# Patient Record
Sex: FEMALE | Race: WHITE
[De-identification: names, ages, dates, MRNs, and addresses within clinical notes are randomized per-mention and may not be internally consistent; named-entity substitution may affect disease eponyms.]

---

## 2017-05-12 ENCOUNTER — HOSPITAL ENCOUNTER (OUTPATIENT)
Dept: HOSPITAL 47 - RADMAMWWP | Age: 67
Discharge: HOME | End: 2017-05-12
Attending: SURGERY
Payer: MEDICARE

## 2017-05-12 DIAGNOSIS — R92.2: ICD-10-CM

## 2017-05-12 DIAGNOSIS — Z12.31: Primary | ICD-10-CM

## 2017-05-12 PROCEDURE — 77063 BREAST TOMOSYNTHESIS BI: CPT

## 2017-05-16 NOTE — MM
Reason for exam: screening  (asymptomatic).

Last mammogram was performed 1 year and 1 month ago.



History:

Patient is postmenopausal.

Family history of breast cancer in sister at age 50.

Benign US biopsy breast VAD LT of the left breast, December 26, 2014.  Benign 
US 

breast needle core LT of the left breast, June 18, 2014.  Benign left mammotome 

panel of the left breast, November 27, 2013.

Took estrogen for 10 years.



Physical Findings:

A clinical breast exam by your physician is recommended on an annual basis and 

results should be correlated with mammographic findings.



MG 3D Screening Mammo W/Cad

Bilateral CC and MLO view(s) were taken.

Prior study comparison: April 25, 2016, bilateral MG 3d screening mammo w/cad.  

June 29, 2015, left breast US breast LT.  December 17, 2014, bilateral MG 

diagnostic mammo w CAD [facility]O.  August 28, 2012, mammogram, performed at [facility].

There are scattered fibroglandular densities.  Previous mammotome biopsy within 

the right breast x two with associated focal asymmetry. Stable grouped 

calcifications medial posterior right breast. Focal asymmetry 1 o'clock in the 

left breast at a middle depth appears more defined. It incompletely disperses 
on 

tomosynthesis images.





ASSESSMENT: Incomplete: need additional imaging evaluation, BI-RAD 0



RECOMMENDATION:

Special view mammogram of the left breast.



If lesion persists on supplemental views, image directed ultrasound is 

recommended.



[facility] will attempt to contact patient to return for 
supplemental 

views and ultrasound if indicated.

[facility]

## 2017-05-24 ENCOUNTER — HOSPITAL ENCOUNTER (OUTPATIENT)
Dept: HOSPITAL 47 - RADMAMWWP | Age: 67
Discharge: HOME | End: 2017-05-24
Attending: SURGERY
Payer: MEDICARE

## 2017-05-24 DIAGNOSIS — R92.8: Primary | ICD-10-CM

## 2017-05-24 PROCEDURE — 76642 ULTRASOUND BREAST LIMITED: CPT

## 2017-05-24 NOTE — MM
Reason for exam: additional evaluation requested from abnormal screening.

Last mammogram was performed less than 1 month ago.



History:

Patient is postmenopausal.

Family history of breast cancer in sister at age 50.

Benign US biopsy breast VAD LT of the left breast, December 26, 2014.  Benign US 

breast needle core LT of the left breast, June 18, 2014.  Benign left mammotome 

panel of the left breast, November 27, 2013.

Took estrogen for 10 years.



Physical Findings:

Nurse Summary: 1cm nodule in the left breast at 10 o'clock (nurse mm).



MG 3D Work Up W/Cad LT

ML, spot compression CC, and spot compression MLO view(s) were taken of the left 

breast.

Prior study comparison: May 12, 2017, bilateral MG 3d screening mammo w/cad.  

April 25, 2016, bilateral MG 3d screening mammo w/cad.

The breast tissue is heterogeneously dense. This may lower the sensitivity of 

mammography.

Finding #1: There is a 5 mm mass in the outer quadrant of the left breast.

Finding #2: There are typically benign calcifications in the left breast.



These results were verbally communicated with the patient and result sheet given 

to the patient on 05/24/17.





ASSESSMENT: Incomplete: need additional imaging evaluation, BI-RAD 0



RECOMMENDATION:

Ultrasound of the left breast.

## 2017-05-24 NOTE — USB
Reason for exam: additional evaluation requested from abnormal screening.



History:

Patient is postmenopausal.

Family history of breast cancer in sister at age 50.

Benign US biopsy breast VAD LT of the left breast, December 26, 2014.  Benign US 

breast needle core LT of the left breast, June 18, 2014.  Benign left mammotome 

panel of the left breast, November 27, 2013.

Took estrogen for 10 years.



Physical Findings:

Nurse did not find any significant physical abnormalities on exam.



US Breast Workup Limited LT

Left breast ultrasound demonstrates a 0.5 x 0.4 x 0.4cm hypoechoic lesion at 4 

o'clock and a 0.4 x 0.3 x 0.4cm hypoechoic lesion at 4 o'clock.



Multiple nodes noted in axilla.



These results were verbally communicated with the patient and result sheet given 

to the patient on 05/24/17.





ASSESSMENT: Suspicious, BI-RAD 4



RECOMMENDATION:

Ultrasound core biopsy of the left breast.



Called with mammographic findings and has scheduled an appointment for the patient

for 05/30/17 at 9:00 with Dr. Pham.



PRELIMINARY REPORT CALLED AND FAXED TO DR. PHAM ON  05/24/17 /TMP.

## 2017-06-05 ENCOUNTER — HOSPITAL ENCOUNTER (OUTPATIENT)
Dept: HOSPITAL 47 - RADUSWWP | Age: 67
End: 2017-06-05
Attending: SURGERY
Payer: MEDICARE

## 2017-06-05 DIAGNOSIS — N60.32: Primary | ICD-10-CM

## 2017-06-05 DIAGNOSIS — R92.8: ICD-10-CM

## 2017-06-05 DIAGNOSIS — N64.1: ICD-10-CM

## 2017-06-05 PROCEDURE — 76942 ECHO GUIDE FOR BIOPSY: CPT

## 2017-06-05 PROCEDURE — 19083 BX BREAST 1ST LESION US IMAG: CPT

## 2017-06-05 PROCEDURE — 19000 PUNCTURE ASPIR CYST BREAST: CPT

## 2017-06-05 PROCEDURE — 88305 TISSUE EXAM BY PATHOLOGIST: CPT

## 2017-06-05 NOTE — USB
EXAMINATION TYPE: US breast aspiration single LT, MG diagnostic mammo LT wo CAD
, US biopsy breast VAD LT

 

DATE OF EXAM: 6/5/2017

 

CLINICAL HISTORY: R92.8 abn mamm.

 

TECHNIQUE: Ultrasound guided fine-needle aspiration and core biopsy of left 
breast with clip placement and follow-up two-view mammogram.  

 

COMPARISON: Diagnostic left breast mammogram and ultrasound workup May 24, 2017 
and older studies.

 

FINDINGS: The procedure of ultrasound guided fine-needle aspiration and/or core 
biopsy was explained to the patient.  Benefits, alternatives, and risks were 
discussed.  An informed consent was then obtained.  

 

The patient was placed in supine positioning for  imaging and for the 
procedure. Preprocedure imaging redemonstrates 2 well-defined 3 to 4 mm 
hypoechoic anechoic lesions 4:00 position zone B see in the left breast which 
are believed to correlate to area of concern on prior ultrasound. The overlying 
skin was prepped and draped in usual sterile fashion.  Lidocaine buffered with 
bicarbonate was used as anesthetic into the skin and subcutaneous tissue up to 
area of concern in the left breast. Under ultrasound guidance, one lesion is 
successfully aspirated with less than 1 cc of yellow-colored fluid utilizing 20-
gauge spinal needle. Fluid is discarded due to limited volume and benign gross 
appearance. Second lesion is unsuccessful in aspiration. Under ultrasound 
guidance, a 12-gauge vacuum assisted biopsy gun device was used to obtain 2 
core samples.  Following this, a biopsy clip was left in lesion.  

 

The patient tolerated the procedure well without any immediate complication.  
The patient was kept in the radiology department for short stay after the 
procedure and then discharged home in stable condition.  

 

Postprocedure mammogram shows successful deployment of new clip inferior outer 
quadrant correlating with 4:00 position on ultrasound.

 

IMPRESSION: Successful, uncomplicated ultrasound guided core biopsy of area of 
concern in the left breast, full pathology results to follow.

 

Low index of suspicion noted at time of procedure.



Pathology Results: Benign



BREAST, LEFT, CORE BIOPSY: BENIGN FIBROADIPOSE TISSUE WITH FAT NECROSIS, 
CHRONIC INFLAMMATION AND FIBROSIS. BREAST ELEMENTS ARE NOT IDENTIFIED.



Recommendation

Follow up ultrasound of the left breast in 6 months.

 

 

 

MTDD

## 2018-06-18 ENCOUNTER — HOSPITAL ENCOUNTER (OUTPATIENT)
Dept: HOSPITAL 47 - RADMAMWWP | Age: 68
Discharge: HOME | End: 2018-06-18
Attending: FAMILY MEDICINE
Payer: MEDICARE

## 2018-06-18 DIAGNOSIS — Z12.31: Primary | ICD-10-CM

## 2018-06-18 PROCEDURE — 77067 SCR MAMMO BI INCL CAD: CPT

## 2018-06-18 PROCEDURE — 77063 BREAST TOMOSYNTHESIS BI: CPT

## 2018-06-18 NOTE — MM
Reason for exam: screening  (asymptomatic).

Last mammogram was performed 1 year ago.



History:

Patient is postmenopausal and history of other cancer.

Family history of breast cancer in sister at age 50.

US breast aspiration single LT of the left breast, June 5, 2017.  Benign US biopsy

breast VAD LT of the left breast, June 5, 2017.  Benign US biopsy breast VAD LT 

of the left breast, December 26, 2014.  Benign US breast needle core LT of the 

left breast, June 18, 2014.  Benign left mammotome panel of the left breast, 

November 27, 2013.

Took estrogen for 10 years.



Physical Findings:

A clinical breast exam by your physician is recommended on an annual basis and 

results should be correlated with mammographic findings.



MG 3D Screening Mammo W/Cad

Bilateral CC and MLO view(s) were taken.

Prior study comparison: June 5, 2017, left breast MG diagnostic mammo LT wo CAD.  

May 24, 2017, left breast MG 3d work up w/cad LT.

The breast tissue is heterogeneously dense. This may lower the sensitivity of 

mammography.

Finding #1: Stable architectural distortion in the left breast.

Finding #2: There are typically benign dystrophic, round, grouped/clustered and 

scattered calcifications in both breasts. Previous mammotome biopsy in the left 

breast x 3. There is no discrete abnormality.





ASSESSMENT: Benign, BI-RAD 2



RECOMMENDATION:

Routine screening mammogram of both breasts in 1 year.

## 2018-11-02 ENCOUNTER — HOSPITAL ENCOUNTER (OUTPATIENT)
Dept: HOSPITAL 47 - RADCTMAIN | Age: 68
Discharge: HOME | End: 2018-11-02
Attending: UROLOGY
Payer: MEDICARE

## 2018-11-02 DIAGNOSIS — N21.0: ICD-10-CM

## 2018-11-02 DIAGNOSIS — K57.30: ICD-10-CM

## 2018-11-02 DIAGNOSIS — N20.0: Primary | ICD-10-CM

## 2018-11-02 PROCEDURE — 74178 CT ABD&PLV WO CNTR FLWD CNTR: CPT

## 2018-11-02 NOTE — CT
EXAMINATION TYPE: CT abdomen pelvis wo/w con

 

DATE OF EXAM: 11/2/2018

 

HISTORY: Multiple UTI's. Pelvic pain.

 

CT DLP: 1933.7mGycm  Automated Exposure Control for Dose Reduction was Utilized.

 

CONTRAST: 

CT scan of the abdomen and pelvis is performed without and with IV Contrast, patient injected with 10
0 mL of Isovue 300.

 

COMPARISON: CTA chest dated 9/27/2013

 

FINDINGS:

 

LUNG BASES: The left lung base there is a 1.4 cm pulmonary nodule. This is very similar in comparison
 to the exam of 9/27/2013 where this measured 1.2 cm. There is question to be internal macroscopic fa
t and this could relate to a trauma as there are smooth and lobular borders. Bibasilar subsegmental a
telectasis is noted.

 

LIVER/GB: Hepatic parenchyma is diffusely hypoattenuated in comparison to that of the spleen, most co
mmonly seen in hepatic steatosis. This finding limits evaluation for hepatic masses. No gross evidenc
e of hepatic mass is seen. No intrahepatic biliary ductal dilatation. Gallbladder surgically absent.

 

PANCREAS: No significant abnormality is seen.

 

SPLEEN: No significant abnormality is seen.

 

ADRENALS: No significant abnormality is seen.

 

KIDNEYS: On the noncontrast images there is there are bilateral renal calculi. On the left there are 
2 3 mm nonobstructing upper pole renal calculi and on the right there is one mid pole 3 mm nonobstruc
ting calculus.

 

On the nephrographic phase on image 37 in both kidneys there are 4 mm hypoattenuated lesions that are
 too small to accurately characterize.

 

No suspicious renal mass is seen of either kidney. There is no evidence of hydronephrosis of either k
idney. No uroepithelial thickening is seen. Automated no proximal ureteral stricture is identified. U
rinary bladder is not opacified and incompletely evaluated, however there is a calculus within the an
terior superior urinary bladder appearing to be within the wall or lying dependently against the wall
.

 

BOWEL: Multiple colonic diverticula some containing inspissated contrast and/or debris. No pericoloni
c fat stranding is noted. Contrast is seen retained within the distal esophagus that could relate to 
gastroesophageal reflux or decreased propulsion of contrast. The cecum demonstrates slight wall thick
ening on series 6 image 66. Moderate amount retained colonic stool is noted, slightly limiting evalua
tion of the bowel. No dilated large or small bowel is seen duodenal diverticulum is incidentally note
d near the head of the pancreas. This contains contrast.

 

UTERUS/ADNEXA: Uterus appears surgically absent.

 

LYMPH NODES: No greater than 1cm abdominal or pelvic lymph nodes are appreciated.

 

OSSEOUS STRUCTURES: Minimal multilevel degenerative changes of the spine are seen

 

OTHER: Abdominal aorta is of normal course and caliber with mild atherosclerosis.

 

IMPRESSION:

1. Bilateral renal calculi without evidence of obstructive uropathy.

2. Punctate solitary urinary bladder calculus intimately associated with the urinary bladder wall davoin
t may be sequela of repeated infections or may represent a recently passed renal calculus.

3. No evidence of suspicious renal mass or uroepithelial thickening. Too small to accurately characte
rize bilateral renal lesions are of low suspicion.

4. Colonic diverticulosis without evidence of acute diverticulitis.

5. 1.4 cm left lower lobe pulmonary nodule suspected to represent a benign hamartoma demonstrating on
ly 2 mm of interval growth since 2013, therefore highly likely benign.

## 2019-06-24 ENCOUNTER — HOSPITAL ENCOUNTER (OUTPATIENT)
Dept: HOSPITAL 47 - RADMAMWWP | Age: 69
Discharge: HOME | End: 2019-06-24
Attending: FAMILY MEDICINE
Payer: MEDICARE

## 2019-06-24 DIAGNOSIS — Z12.31: Primary | ICD-10-CM

## 2019-06-24 PROCEDURE — 77063 BREAST TOMOSYNTHESIS BI: CPT

## 2019-06-24 PROCEDURE — 77067 SCR MAMMO BI INCL CAD: CPT

## 2019-06-25 NOTE — MM
Reason for exam: screening  (asymptomatic).

Last mammogram was performed 1 year ago.



History:

Patient is postmenopausal and history of other cancer.

Family history of breast cancer in sister at age 50.

US breast aspiration single LT of the left breast, June 5, 2017.  Benign US biopsy

breast VAD LT of the left breast, June 5, 2017.  Benign US biopsy breast VAD LT 

of the left breast, December 26, 2014.  Benign US breast needle core LT of the 

left breast, June 18, 2014.  Benign left mammotome panel of the left breast, 

November 27, 2013.

Took estrogen for 10 years.



Physical Findings:

A clinical breast exam by your physician is recommended on an annual basis and 

results should be correlated with mammographic findings.



MG 3D Screening Mammo W/Cad

Bilateral CC and MLO view(s) were taken.

Prior study comparison: June 18, 2018, bilateral MG 3d screening mammo w/cad.  

June 5, 2017, left breast MG diagnostic mammo LT wo CAD.

The breast tissue is heterogeneously dense. This may lower the sensitivity of 

mammography.  Stable scattered calcifications.  No significant changes when 

compared with prior studies.





ASSESSMENT: Benign, BI-RAD 2



RECOMMENDATION:

Routine screening mammogram of both breasts in 1 year.

## 2020-04-17 ENCOUNTER — HOSPITAL ENCOUNTER (OUTPATIENT)
Dept: HOSPITAL 47 - RADPETMAIN | Age: 70
Discharge: HOME | End: 2020-04-17
Attending: INTERNAL MEDICINE
Payer: MEDICARE

## 2020-04-17 DIAGNOSIS — C43.39: Primary | ICD-10-CM

## 2020-04-17 PROCEDURE — 78816 PET IMAGE W/CT FULL BODY: CPT

## 2020-04-20 NOTE — PE
EXAMINATION TYPE: PET CT fusion whole body

 

DATE OF EXAM: 4/17/2020

 

COMPARISON: CT abdomen and pelvis November 2, 2018

 

HISTORY: Melanoma of forehead diagnosed May 15, 2018 and treated surgically during 20/1/2018.   

 

TECHNIQUE:  Following the intravenous administration of 10.20 mCi of F-18 FDG, whole body images are 
performed from the top of skull to the bottom of the feet.  Images are reviewed on the computer in th
e coronal, axial, and sagittal planes.  Reconstructed rotating images are created on independent work
station and reviewed on the computer.   A noncontrast CT is performed in conjunction with the PET sca
n.

 

SCAN: Initial Scan

 

FINDINGS: 

 

HEAD AND NECK:  No areas of suspicious hypermetabolic uptake including scalp lesions or adenopathy.

 

CHEST, MEDIASTINUM, AND HILAR REGION: No areas of suspicious hypermetabolic uptake.

 

ABDOMEN AND PELVIS: No areas of suspicious hypermetabolic uptake.

 

OSSEOUS STRUCTURES: No areas of suspicious hypermetabolic uptake.

 

EXTREMITIES: No areas of suspicious hypermetabolic uptake.

 

Other: Surgical changes medially left breast axial image 107 are present.

 

Moderate coronary artery calcification is redemonstrated noted marker for coronary artery disease. St
able 1.4 cm left basilar pulmonary nodule axial image 131 which is ametabolic noted not significantly
 changed from 2013 CT presumed benign.

 

Liver remains low dense consistent with diffuse fatty infiltration. Cholecystectomy clips are again s
een. Diverticula in the left and sigmoid colon. Uterus is surgically absent or markedly atrophic. A f
ew scattered pelvic phleboliths. Calcifications lower bladder redemonstrated. Small bilateral renal c
alculi redemonstrated.

 

Metallic hardware from total right knee arthroplasty.

 

IMPRESSION: No new suspicious hypermetabolic masses to suggest metastatic melanoma recurrence.

## 2020-07-13 ENCOUNTER — HOSPITAL ENCOUNTER (OUTPATIENT)
Dept: HOSPITAL 47 - RADMAMWWP | Age: 70
Discharge: HOME | End: 2020-07-13
Attending: FAMILY MEDICINE
Payer: MEDICARE

## 2020-07-13 DIAGNOSIS — Z12.31: Primary | ICD-10-CM

## 2020-07-13 PROCEDURE — 77067 SCR MAMMO BI INCL CAD: CPT

## 2020-07-13 PROCEDURE — 77063 BREAST TOMOSYNTHESIS BI: CPT

## 2020-07-15 NOTE — MM
Reason for exam: screening  (asymptomatic).

Last mammogram was performed 1 year and 1 month ago.



History:

Patient is postmenopausal and has history of other cancer at age 67.

Family history of breast cancer in sister at age 50.

US breast aspiration single LT of the left breast, June 5, 2017.  Benign US biopsy

breast VAD LT of the left breast, June 5, 2017.  Benign US biopsy breast VAD LT 

of the left breast, December 26, 2014.  Benign US breast needle core LT of the 

left breast, June 18, 2014.  Benign left mammotome panel of the left breast, 

November 27, 2013.

Took estrogen for 10 years.



Physical Findings:

A clinical breast exam by your physician is recommended on an annual basis and 

results should be correlated with mammographic findings.



MG 3D Screening Mammo W/Cad

Bilateral CC and MLO view(s) were taken.

Prior study comparison: June 24, 2019, bilateral MG 3d screening mammo w/cad.  

June 18, 2018, bilateral MG 3d screening mammo w/cad.

The breast tissue is heterogeneously dense. This may lower the sensitivity of 

mammography.  Asymmetric breast tissue in the right upper outer quadrant 6cm from 

nipple.

This finding is changed when compared with previous exams.





ASSESSMENT: Incomplete: need additional imaging evaluation, BI-RAD 0



RECOMMENDATION:

Ultrasound of the right breast.



[facility] will attempt to contact patient  to return for ultrasound.

## 2020-09-11 ENCOUNTER — HOSPITAL ENCOUNTER (OUTPATIENT)
Dept: HOSPITAL 47 - RADPETMAIN | Age: 70
Discharge: HOME | End: 2020-09-11
Attending: INTERNAL MEDICINE
Payer: MEDICARE

## 2020-09-11 DIAGNOSIS — C77.0: ICD-10-CM

## 2020-09-11 DIAGNOSIS — C43.39: Primary | ICD-10-CM

## 2020-09-11 PROCEDURE — 78816 PET IMAGE W/CT FULL BODY: CPT

## 2020-09-14 NOTE — PE
EXAMINATION TYPE: PET CT fusion whole body

 

DATE OF EXAM: 9/11/2020

 

COMPARISON: Whole body PET/CT April 17, 2020 and older CTs.

 

HISTORY: Malignant melanoma progress study.   Originally diagnosed May 15, 2018 in the forehead with 
recurrence same location December 30, 2019.

 

TECHNIQUE:  Following the intravenous administration of 11.635 mCi of F-18 FDG, whole body images are
 performed from the top of skull to the bottom of feet.  Images are reviewed on the computer in the c
oronal, axial, and sagittal planes.  Reconstructed rotating images are created on independent worksta
tion and reviewed on the computer.   A noncontrast CT is performed in conjunction with the PET scan.

 

SCAN: Subsequent Scan

 

FINDINGS: 

 

HEAD AND NECK:  No new areas of suspicious hypermetabolic uptake in particular no suspicious hypermet
abolic soft tissue scalp lesions. 

 

CHEST, MEDIASTINUM, AND HILAR REGION: No new areas of suspicious hypermetabolic uptake.

 

ABDOMEN AND PELVIS: Normal excretion redemonstrated. More prominent mild/moderate diffuse bowel uptak
e on current study is nonspecific. No areas of abnormal new suspicious hypermetabolic uptake on curre
nt study.

 

OSSEOUS STRUCTURES: No new areas of suspicious hypermetabolic uptake.

 

EXTREMITIES: No new areas of suspicious hypermetabolic uptake.

 

OTHER CT: Surgical changes medially left breast axial image 110 are redemonstrated.

 

Mild to moderate coronary artery calcification is redemonstrated which is noted marker for coronary a
rtery disease. Stable 1.4 cm left basilar pulmonary nodule axial image 131 which remains ametabolic n
oted not significantly changed from 2013 CT presumed benign.

 

Liver remains low dense consistent with diffuse fatty infiltration. Cholecystectomy clips are again s
een. Diverticula in the left and sigmoid colon. Uterus is surgically absent or markedly atrophic. A f
ew scattered pelvic phleboliths. Calcifications inferior bladder redemonstrated. Small bilateral ariane
l calculi redemonstrated.

 

Metallic hardware from total right knee arthroplasty again seen.

 

 

IMPRESSION: No new areas of suspicious hypermetabolic soft tissue masses to suggest residual or metas
tatic melanoma.

## 2021-03-15 ENCOUNTER — HOSPITAL ENCOUNTER (OUTPATIENT)
Dept: HOSPITAL 47 - RADMAMWWP | Age: 71
End: 2021-03-15
Attending: FAMILY MEDICINE
Payer: MEDICARE

## 2021-03-15 DIAGNOSIS — N64.89: Primary | ICD-10-CM

## 2021-03-15 DIAGNOSIS — Z78.0: ICD-10-CM

## 2021-03-15 DIAGNOSIS — R92.2: ICD-10-CM

## 2021-03-15 DIAGNOSIS — Z80.3: ICD-10-CM

## 2021-03-15 PROCEDURE — 77061 BREAST TOMOSYNTHESIS UNI: CPT

## 2021-03-15 PROCEDURE — 77065 DX MAMMO INCL CAD UNI: CPT

## 2021-03-16 NOTE — MM
Reason for exam: follow-up at short interval from prior study.

Last mammogram was performed 8 months ago.



History:

Patient is postmenopausal and has history of other cancer at age 67.

Family history of breast cancer in sister at age 50.

US breast aspiration single LT of the left breast, June 5, 2017.  Benign US biopsy

breast VAD LT of the left breast, June 5, 2017.  Benign US biopsy breast VAD LT 

of the left breast, December 26, 2014.  Benign US breast needle core LT of the 

left breast, June 18, 2014.  Benign left mammotome panel of the left breast, 

November 27, 2013.

Took estrogen for 10 years.



Physical Findings:

Nurse did not find any significant physical abnormalities on exam.



MG 3D Diag Mammo W/Cad RT

CC and MLO view(s) were taken of the right breast.

Prior study comparison: July 13, 2020, bilateral MG 3d screening mammo w/cad.  

June 24, 2019, bilateral MG 3d screening mammo w/cad.

The breast tissue is heterogeneously dense. This may lower the sensitivity of 

mammography.  Upper outer quadrant focal asymmetry stable to less defined. An 

additional short follow up can be performed.



These results were verbally communicated with the patient and result sheet given 

to the patient on 3/15/21.





ASSESSMENT: Probably benign, BI-RAD 3



RECOMMENDATION:

Follow-up diagnostic mammogram of both breasts in 4 months.

Back on schedule for July 2021.

## 2021-04-23 ENCOUNTER — HOSPITAL ENCOUNTER (OUTPATIENT)
Dept: HOSPITAL 47 - RADPETMAIN | Age: 71
Discharge: HOME | End: 2021-04-23
Attending: INTERNAL MEDICINE
Payer: MEDICARE

## 2021-04-23 DIAGNOSIS — Z85.820: Primary | ICD-10-CM

## 2021-04-23 DIAGNOSIS — C43.39: ICD-10-CM

## 2021-04-23 DIAGNOSIS — C77.0: ICD-10-CM

## 2021-04-23 PROCEDURE — 78815 PET IMAGE W/CT SKULL-THIGH: CPT

## 2021-04-26 NOTE — PE
EXAMINATION TYPE: PET CT fusion skull to thigh

 

DATE OF EXAM: 4/23/2021

 

COMPARISON: Prior PET/CT September 11, 2020

 

HISTORY: Malignant melanoma diagnosed forehead with surgical excision June 21, 2018, local recurrence
 in the left with surgical excision January 21, 2021.

 

TECHNIQUE:  Following the intravenous administration of 13.41 mCi of F-18 FDG, whole body images are 
performed from the skull base to the bottom of feet.  Images are reviewed on the computer in the nieves
nal, axial, and sagittal planes.  Reconstructed rotating images are created on independent workstatio
n and reviewed on the computer.   A localization and attenuation correction CT is performed in conjun
ction with the PET scan. Blood glucose level = 15.

 

SCAN: Initial Scan

 

FINDINGS: 

 

SKULL BASE AND NECK:  Entire head was not included in field of view on today's study. No suspicious h
ypermetabolic uptake identified to suggest neoplasm.

 

CHEST, MEDIASTINUM, AND HILAR REGION: No suspicious hypermetabolic uptake identified to suggest neopl
asm.

 

ABDOMEN AND PELVIS: Nonspecific bowel uptake. Normal excretion. No suspicious hypermetabolic uptake.

 

OSSEOUS STRUCTURES: No suspicious hypermetabolic uptake.

 

Lower extremities: No suspicious hypermetabolic uptake.

 

OTHER CT: Surgical changes medially left breast axial image 97 are redemonstrated.

 

Moderate coronary artery calcification is redemonstrated  Stable 1.4 cm left basilar pulmonary nodule
 axial image 113 which remains ametabolic noted not significantly changed from prior studies.

 

Liver remains low dense consistent with diffuse fatty infiltration. Cholecystectomy clips are redemon
strated . Diverticula in the left and sigmoid colon. Uterus is surgically absent . A few scattered pe
lvic phleboliths redemonstrated. Calcifications inferior bladder redemonstrated. Small bilateral intr
aluminal calculi redemonstrated.

 

Metallic hardware from total right knee arthroplasty again seen.

 

IMPRESSION: No new areas of hypermetabolic uptake to suggest new metastatic or persistent recurrent a
ctive neoplasm.

## 2021-07-09 ENCOUNTER — HOSPITAL ENCOUNTER (OUTPATIENT)
Age: 71
Discharge: HOME | End: 2021-07-09
Payer: MEDICARE

## 2022-05-09 ENCOUNTER — HOSPITAL ENCOUNTER (OUTPATIENT)
Dept: HOSPITAL 47 - RADMAMWWP | Age: 72
Discharge: HOME | End: 2022-05-09
Attending: FAMILY MEDICINE
Payer: MEDICARE

## 2022-05-09 DIAGNOSIS — R92.1: Primary | ICD-10-CM

## 2022-05-09 DIAGNOSIS — Z80.3: ICD-10-CM

## 2022-05-09 DIAGNOSIS — Z78.0: ICD-10-CM

## 2022-05-09 PROCEDURE — 77066 DX MAMMO INCL CAD BI: CPT

## 2022-05-09 PROCEDURE — 77062 BREAST TOMOSYNTHESIS BI: CPT

## 2022-05-09 NOTE — MM
Reason for exam: additional evaluation requested from prior study.

Last mammogram was performed 10 months ago.



History:

Patient is postmenopausal and has history of other cancer at age 67.

Family history of breast cancer in sister at age 51 and breast cancer in daughter.

US breast aspiration single LT of the left breast, June 5, 2017.  Benign US biopsy

breast VAD LT of the left breast, June 5, 2017.  Benign US biopsy breast VAD LT 

of the left breast, December 26, 2014.  Benign US breast needle core LT of the 

left breast, June 18, 2014.  Benign left mammotome panel of the left breast, 

November 27, 2013.

Took estrogen for 10 years.



Physical Findings:

A clinical breast exam by your physician is recommended on an annual basis and 

results should be correlated with mammographic findings.



MG 3D Diag Mammo W/Cad ROMEO

Bilateral CC and MLO view(s) were taken.

Prior study comparison: March 15, 2021, right breast MG 3d diag mammo w/cad RT.

The breast tissue is heterogeneously dense. This may lower the sensitivity of 

mammography.  There are benign appearing round, dystrophic calcifications. 

Previous mammotome biopsy in the left breast x 3. There is chronic nodularity in 

the left breast. There is no discrete abnormality.



Results were given to the patient verbally at the time of the exam.





ASSESSMENT: Benign, BI-RAD 2



RECOMMENDATION:

Routine screening mammogram of both breasts in 1 year.

## 2023-05-11 ENCOUNTER — HOSPITAL ENCOUNTER (OUTPATIENT)
Dept: HOSPITAL 47 - RADMAMWWP | Age: 73
Discharge: HOME | End: 2023-05-11
Attending: FAMILY MEDICINE
Payer: MEDICARE

## 2023-05-11 DIAGNOSIS — Z12.31: Primary | ICD-10-CM

## 2023-05-11 DIAGNOSIS — Z80.3: ICD-10-CM

## 2023-05-11 DIAGNOSIS — Z13.820: ICD-10-CM

## 2023-05-11 DIAGNOSIS — M85.89: ICD-10-CM

## 2023-05-11 DIAGNOSIS — Z78.0: ICD-10-CM

## 2023-05-11 PROCEDURE — 77063 BREAST TOMOSYNTHESIS BI: CPT

## 2023-05-11 PROCEDURE — 77080 DXA BONE DENSITY AXIAL: CPT

## 2023-05-11 PROCEDURE — 77067 SCR MAMMO BI INCL CAD: CPT

## 2023-05-11 NOTE — BD
EXAMINATION TYPE: Axial Bone Density

 

DATE OF EXAM: 5/11/2023

 

CLINICAL HISTORY: 72 years old Female.  ICD-10 CODE: [id number] SCREENING FOR OSTEO

 

Height:  62 .2 in

Weight:  180 lbs

 

FRAX RISK QUESTIONS:

Secondary Osteoporosis:

    3.  Menopause before 45: partial hysterectomy age 28

 

RISK FACTORS 

HISTORY OF: 

History of Wrist Fracture: rt wrist age 10

Family History of Osteoporosis: yes mother and father

Active: yes

Diet low in dairy products/other sources of calcium:  yes

Postmenopausal woman: partial hysterectomy age 28

Take estrogen and/or progesterone medications: not now

How long: took for 10 years

 

MEDICATIONS: 

Thyroid Medications:  yes

Which medication: Levothyroxine

How Long: 15+ years

Additional Medications: calcium, vit d, diabetes meds, baby aspirin, omeprazole, multi vit,  

 

 

 

EXAM MEASUREMENTS: 

Bone mineral densitometry was performed using the [employer] System.

Bone mineral density as measured about the Lumbar spine is:  

----- L1-L4(G/cm2): 1.211

T Score Values are as follows:

----- L1: 0.1

----- L2: 0.2

----- L3: 0.6

----- L4: 0.0

----- L1-L4: 0.3

 

Z Score Values are as follows:

----- L1: 1.3

----- L2: 1.4

----- L3: 1.8

----- L4: 1.2

----- L1-L4: 1.4

 

Bone mineral density baseline

 

Bone mineral density about the R hip (g/cm2): 0.898

Bone mineral density about the L hip (g/cm2): 0.891

T Score values are as follows:

-----R Neck: -1.1

-----L Neck: -1.5

-----R Total: -0.9

-----L Total: -0.9

 

Z Score values are as follows:

-----R Neck: -0.5

-----L Neck: 0.0

-----R Total: 0.4

-----L Total: 0.4

 

Bone mineral density baseline

 

 

FRAX%s: The graph provided illustrates a 10.5% chance for a major osteoporotic fx and a 2.0% chance f
or the hips probability for fx in 10 years time.

 

 

 

 

IMPRESSION:

Osteopenia (T Score between -2.5 and -1).

 

There is slightly increased risk of fracture and the patient may be considered 

for treatment. 

 

Re-Screen 2-5 years.

 

NOTE:  T-SCORE=SD OF THE YOUNG ADULT MEAN.

## 2023-05-12 NOTE — MM
Reason for Exam: Screening  (asymptomatic). 

Last screening mammogram was performed 12 month(s) ago.





Patient History: 

Menarche at age 10. First Full-Term Pregnancy at age 17. Hysterectomy at age 28. Postmenopausal.

Other cancer, age 67. Patient used Estrogen for 10 years. 6/5/2017, Cyst Aspiration on the Left

side. 6/5/2017, Benign Core Biopsy on the left side. 12/26/2014, Benign Core Biopsy on the left

side. 6/18/2014, Benign Core Biopsy on the left side. 11/27/2013, Benign Core Biopsy on the left

side.

Sister had breast cancer, age 51. Daughter had breast cancer. 





Risk Values: 

Heike 5 year model risk: 12.3%.

NCI Lifetime model risk: 25.8%.





Prior Study Comparison: 

3/15/2021 Right Diagnostic Mammogram, [facility]. 7/9/2021 Bilateral Diagnostic Mammogram, [facility]. 5/9/2022

Bilateral Diagnostic Mammogram, [facility]. 





Tissue Density: 

There are scattered fibroglandular densities.





Findings: 

Analyzed By CAD. 

Benign bilateral round and coarse calcifications are present. Chronic nodularity medial left breast,

previously biopsied. 3 microclips in the left breast from prior biopsies. No significant change from

prior exams.

There is no suspicious group of microcalcifications or new suspicious mass in either breast. 





Overall Assessment: Benign, BI-RAD 2





Management: 

Screening Mammogram of both breasts in 1 year.

Patient with high risk scores, see note below.



Patient should continue monthly self-breast exams.  A clinical breast exam by your physician is

recommended on an annual basis.

This exam should not preclude additional follow-up of suspicious palpable abnormalities.



Note on Heike scores and lifetime risk:

1. A Heike score greater than 3% is considered moderate risk. If this is the case, consider

specialist referral to assess eligibility for a risk reducing agent.

2. If overall lifetime risk for the development of breast cancer is 20% or higher, the patient may

qualify for future screening with alternating mammogram and breast MRI.



Electronically signed and approved by: Abe Sanchez M.D. Radiologist

## 2024-05-17 ENCOUNTER — HOSPITAL ENCOUNTER (OUTPATIENT)
Dept: HOSPITAL 47 - RADMAMWWP | Age: 74
Discharge: HOME | End: 2024-05-17
Attending: FAMILY MEDICINE
Payer: MEDICARE

## 2024-05-17 DIAGNOSIS — Z80.3: ICD-10-CM

## 2024-05-17 DIAGNOSIS — Z12.31: Primary | ICD-10-CM

## 2024-05-17 DIAGNOSIS — Z78.0: ICD-10-CM

## 2024-05-17 PROCEDURE — 77063 BREAST TOMOSYNTHESIS BI: CPT

## 2024-05-17 PROCEDURE — 77067 SCR MAMMO BI INCL CAD: CPT

## 2024-05-20 NOTE — MM
Reason for Exam: Screening  (asymptomatic). 

Last screening mammogram was performed 12 month(s) ago.





Patient History: 

Menarche at age 10. First Full-Term Pregnancy at age 17. Hysterectomy at age 28. Postmenopausal.

Patient has history of breast feeding. Other cancer, age 67. Patient used Estrogen for 10 years.

6/5/2017, Cyst Aspiration on the Left side. 6/5/2017, Benign Core Biopsy on the left side.

12/26/2014, Benign Core Biopsy on the left side. 6/18/2014, Benign Core Biopsy on the left side.

11/27/2013, Benign Core Biopsy on the left side.

Sister had breast cancer, age 51. Daughter had breast cancer. 





Risk Values: 

Heike 5 year model risk: 12.3%.

NCI Lifetime model risk: 24.4%.





Prior Study Comparison: 

6/5/2017 Left Diagnostic Mammogram, [facility]. 6/18/2018 Bilateral Screening Mammogram, [facility]. 6/24/2019

Bilateral Screening Mammogram, [facility]. 7/13/2020 Bilateral Screening Mammogram, [facility]. 3/15/2021 Right

Diagnostic Mammogram, [facility]. 7/9/2021 Bilateral Diagnostic Mammogram, [facility]. 5/9/2022 Bilateral

Diagnostic Mammogram, [facility]. 5/11/2023 Bilateral MG 3D screening mammo w/cad, [facility]. 





Tissue Density: 

There are scattered areas of fibroglandular density.





Findings: 

Analyzed By CAD. 

Left breast biopsy clip.



Right breast: There is no suspicious group of microcalcifications or new suspicious mass.

Benign-appearing calcifications right breast.



Left breast: There is no suspicious group of microcalcifications or new suspicious mass.

Benign-appearing calcifications left breast. 





Overall Assessment: Benign, BI-RAD 2





Management: 

Screening Mammogram of both breasts in 1 year.

[facility] will attempt to contact patient to return for supplemental views and

ultrasound if indicated.



Patient should continue monthly self-breast exams.  A clinical breast exam by your physician is

recommended on an annual basis.

This exam should not preclude additional follow-up of suspicious palpable abnormalities.



Note on Heike scores and lifetime risk:

1. A Heike score greater than 3% is considered moderate risk. If this is the case, consider

specialist referral to assess eligibility for a risk reducing agent.

2. If overall lifetime risk for the development of breast cancer is 20% or higher, the patient may

qualify for future screening with alternating mammogram and breast MRI.



Electronically signed and approved by: Ladarius Easton DO

## 2025-07-14 ENCOUNTER — HOSPITAL ENCOUNTER (OUTPATIENT)
Dept: HOSPITAL 47 - RADMAMWWP | Age: 75
Discharge: HOME | End: 2025-07-14
Attending: FAMILY MEDICINE
Payer: MEDICARE

## 2025-07-14 DIAGNOSIS — Z80.3: ICD-10-CM

## 2025-07-14 DIAGNOSIS — Z12.31: Primary | ICD-10-CM

## 2025-07-14 DIAGNOSIS — R92.333: ICD-10-CM

## 2025-07-14 DIAGNOSIS — Z78.0: ICD-10-CM

## 2025-07-14 PROCEDURE — 77063 BREAST TOMOSYNTHESIS BI: CPT

## 2025-07-14 PROCEDURE — 77067 SCR MAMMO BI INCL CAD: CPT
